# Patient Record
Sex: FEMALE | Race: OTHER | HISPANIC OR LATINO | Employment: UNEMPLOYED | URBAN - METROPOLITAN AREA
[De-identification: names, ages, dates, MRNs, and addresses within clinical notes are randomized per-mention and may not be internally consistent; named-entity substitution may affect disease eponyms.]

---

## 2021-08-07 ENCOUNTER — HOSPITAL ENCOUNTER (EMERGENCY)
Facility: HOSPITAL | Age: 36
Discharge: HOME | End: 2021-08-07
Attending: EMERGENCY MEDICINE
Payer: OTHER GOVERNMENT

## 2021-08-07 VITALS
HEART RATE: 82 BPM | RESPIRATION RATE: 20 BRPM | OXYGEN SATURATION: 98 % | HEIGHT: 58 IN | WEIGHT: 152 LBS | SYSTOLIC BLOOD PRESSURE: 108 MMHG | BODY MASS INDEX: 31.91 KG/M2 | TEMPERATURE: 98 F | DIASTOLIC BLOOD PRESSURE: 58 MMHG

## 2021-08-07 DIAGNOSIS — U07.1 COVID-19: Primary | ICD-10-CM

## 2021-08-07 LAB
B-HCG UR QL: NEGATIVE
CTP QC/QA: YES
CTP QC/QA: YES
SARS-COV-2 RDRP RESP QL NAA+PROBE: POSITIVE

## 2021-08-07 PROCEDURE — 99284 EMERGENCY DEPT VISIT MOD MDM: CPT | Mod: 25

## 2021-08-07 PROCEDURE — U0002 COVID-19 LAB TEST NON-CDC: HCPCS | Performed by: EMERGENCY MEDICINE

## 2021-08-07 PROCEDURE — 81025 URINE PREGNANCY TEST: CPT | Performed by: PHYSICIAN ASSISTANT

## 2021-08-07 RX ORDER — DEXTROMETHORPHAN HYDROBROMIDE, GUAIFENESIN 5; 100 MG/5ML; MG/5ML
650 LIQUID ORAL 3 TIMES DAILY PRN
Qty: 20 TABLET | Refills: 0 | Status: SHIPPED | OUTPATIENT
Start: 2021-08-07

## 2021-08-07 RX ORDER — ALBUTEROL SULFATE 90 UG/1
2 AEROSOL, METERED RESPIRATORY (INHALATION) EVERY 4 HOURS PRN
Qty: 8 G | Refills: 0 | Status: SHIPPED | OUTPATIENT
Start: 2021-08-07 | End: 2022-08-07

## 2021-08-07 RX ORDER — ONDANSETRON 4 MG/1
4 TABLET, ORALLY DISINTEGRATING ORAL EVERY 6 HOURS PRN
Qty: 20 TABLET | Refills: 0 | Status: SHIPPED | OUTPATIENT
Start: 2021-08-07

## 2021-08-07 RX ORDER — GUAIFENESIN/DEXTROMETHORPHAN 100-10MG/5
10 SYRUP ORAL 4 TIMES DAILY PRN
Qty: 120 ML | Refills: 0 | Status: SHIPPED | OUTPATIENT
Start: 2021-08-07 | End: 2021-08-17

## 2021-08-07 RX ORDER — IBUPROFEN 600 MG/1
600 TABLET ORAL EVERY 6 HOURS PRN
Qty: 20 TABLET | Refills: 0 | Status: SHIPPED | OUTPATIENT
Start: 2021-08-07 | End: 2023-05-23

## 2021-08-12 ENCOUNTER — HOSPITAL ENCOUNTER (INPATIENT)
Facility: HOSPITAL | Age: 36
LOS: 1 days | Discharge: LEFT BEFORE DISCHARGE | DRG: 177 | End: 2021-08-13
Attending: EMERGENCY MEDICINE | Admitting: STUDENT IN AN ORGANIZED HEALTH CARE EDUCATION/TRAINING PROGRAM
Payer: OTHER GOVERNMENT

## 2021-08-12 DIAGNOSIS — U07.1 COVID-19: Primary | ICD-10-CM

## 2021-08-12 LAB
ALBUMIN SERPL BCP-MCNC: 3.5 G/DL (ref 3.5–5.2)
ALP SERPL-CCNC: 123 U/L (ref 55–135)
ALT SERPL W/O P-5'-P-CCNC: 44 U/L (ref 10–44)
ANION GAP SERPL CALC-SCNC: 7 MMOL/L (ref 8–16)
AST SERPL-CCNC: 34 U/L (ref 10–40)
B-HCG UR QL: NEGATIVE
BASOPHILS # BLD AUTO: 0.01 K/UL (ref 0–0.2)
BASOPHILS NFR BLD: 0.1 % (ref 0–1.9)
BILIRUB SERPL-MCNC: 0.3 MG/DL (ref 0.1–1)
BUN SERPL-MCNC: 7 MG/DL (ref 6–20)
CALCIUM SERPL-MCNC: 8.2 MG/DL (ref 8.7–10.5)
CHLORIDE SERPL-SCNC: 105 MMOL/L (ref 95–110)
CK SERPL-CCNC: 84 U/L (ref 20–180)
CO2 SERPL-SCNC: 26 MMOL/L (ref 23–29)
CREAT SERPL-MCNC: 0.7 MG/DL (ref 0.5–1.4)
CRP SERPL-MCNC: 25.3 MG/L (ref 0–8.2)
CTP QC/QA: YES
D DIMER PPP IA.FEU-MCNC: 0.52 MG/L FEU
DIFFERENTIAL METHOD: ABNORMAL
EOSINOPHIL # BLD AUTO: 0 K/UL (ref 0–0.5)
EOSINOPHIL NFR BLD: 0.3 % (ref 0–8)
ERYTHROCYTE [DISTWIDTH] IN BLOOD BY AUTOMATED COUNT: 16.8 % (ref 11.5–14.5)
EST. GFR  (AFRICAN AMERICAN): >60 ML/MIN/1.73 M^2
EST. GFR  (NON AFRICAN AMERICAN): >60 ML/MIN/1.73 M^2
FERRITIN SERPL-MCNC: 23 NG/ML (ref 20–300)
GLUCOSE SERPL-MCNC: 124 MG/DL (ref 70–110)
HCT VFR BLD AUTO: 30.2 % (ref 37–48.5)
HGB BLD-MCNC: 9.4 G/DL (ref 12–16)
IMM GRANULOCYTES # BLD AUTO: 0.03 K/UL (ref 0–0.04)
IMM GRANULOCYTES NFR BLD AUTO: 0.3 % (ref 0–0.5)
LACTATE SERPL-SCNC: 1.1 MMOL/L (ref 0.5–2.2)
LDH SERPL L TO P-CCNC: 244 U/L (ref 110–260)
LYMPHOCYTES # BLD AUTO: 2.1 K/UL (ref 1–4.8)
LYMPHOCYTES NFR BLD: 23.9 % (ref 18–48)
MCH RBC QN AUTO: 22.7 PG (ref 27–31)
MCHC RBC AUTO-ENTMCNC: 31.1 G/DL (ref 32–36)
MCV RBC AUTO: 73 FL (ref 82–98)
MONOCYTES # BLD AUTO: 0.4 K/UL (ref 0.3–1)
MONOCYTES NFR BLD: 4.9 % (ref 4–15)
NEUTROPHILS # BLD AUTO: 6.2 K/UL (ref 1.8–7.7)
NEUTROPHILS NFR BLD: 70.5 % (ref 38–73)
NRBC BLD-RTO: 0 /100 WBC
PLATELET # BLD AUTO: 181 K/UL (ref 150–450)
PMV BLD AUTO: 11.6 FL (ref 9.2–12.9)
POTASSIUM SERPL-SCNC: 3.8 MMOL/L (ref 3.5–5.1)
PROCALCITONIN SERPL IA-MCNC: 0.02 NG/ML
PROT SERPL-MCNC: 7.5 G/DL (ref 6–8.4)
RBC # BLD AUTO: 4.14 M/UL (ref 4–5.4)
SODIUM SERPL-SCNC: 138 MMOL/L (ref 136–145)
TROPONIN I SERPL DL<=0.01 NG/ML-MCNC: <0.006 NG/ML (ref 0–0.03)
WBC # BLD AUTO: 8.84 K/UL (ref 3.9–12.7)

## 2021-08-12 PROCEDURE — 93010 ELECTROCARDIOGRAM REPORT: CPT | Mod: ,,, | Performed by: INTERNAL MEDICINE

## 2021-08-12 PROCEDURE — 25500020 PHARM REV CODE 255: Performed by: EMERGENCY MEDICINE

## 2021-08-12 PROCEDURE — 99285 EMERGENCY DEPT VISIT HI MDM: CPT | Mod: 25

## 2021-08-12 PROCEDURE — 27000207 HC ISOLATION

## 2021-08-12 PROCEDURE — 94640 AIRWAY INHALATION TREATMENT: CPT

## 2021-08-12 PROCEDURE — 84484 ASSAY OF TROPONIN QUANT: CPT | Performed by: EMERGENCY MEDICINE

## 2021-08-12 PROCEDURE — 86140 C-REACTIVE PROTEIN: CPT | Performed by: EMERGENCY MEDICINE

## 2021-08-12 PROCEDURE — 94761 N-INVAS EAR/PLS OXIMETRY MLT: CPT

## 2021-08-12 PROCEDURE — 82728 ASSAY OF FERRITIN: CPT | Performed by: EMERGENCY MEDICINE

## 2021-08-12 PROCEDURE — 63600175 PHARM REV CODE 636 W HCPCS: Performed by: EMERGENCY MEDICINE

## 2021-08-12 PROCEDURE — 82550 ASSAY OF CK (CPK): CPT | Performed by: EMERGENCY MEDICINE

## 2021-08-12 PROCEDURE — 96360 HYDRATION IV INFUSION INIT: CPT

## 2021-08-12 PROCEDURE — 80053 COMPREHEN METABOLIC PANEL: CPT | Performed by: EMERGENCY MEDICINE

## 2021-08-12 PROCEDURE — 85025 COMPLETE CBC W/AUTO DIFF WBC: CPT | Performed by: EMERGENCY MEDICINE

## 2021-08-12 PROCEDURE — 81025 URINE PREGNANCY TEST: CPT | Performed by: EMERGENCY MEDICINE

## 2021-08-12 PROCEDURE — 93010 EKG 12-LEAD: ICD-10-PCS | Mod: ,,, | Performed by: INTERNAL MEDICINE

## 2021-08-12 PROCEDURE — 25000242 PHARM REV CODE 250 ALT 637 W/ HCPCS: Performed by: EMERGENCY MEDICINE

## 2021-08-12 PROCEDURE — 93005 ELECTROCARDIOGRAM TRACING: CPT

## 2021-08-12 PROCEDURE — 85379 FIBRIN DEGRADATION QUANT: CPT | Performed by: EMERGENCY MEDICINE

## 2021-08-12 PROCEDURE — 25000003 PHARM REV CODE 250: Performed by: EMERGENCY MEDICINE

## 2021-08-12 PROCEDURE — 83605 ASSAY OF LACTIC ACID: CPT | Performed by: EMERGENCY MEDICINE

## 2021-08-12 PROCEDURE — 12000002 HC ACUTE/MED SURGE SEMI-PRIVATE ROOM

## 2021-08-12 PROCEDURE — 83615 LACTATE (LD) (LDH) ENZYME: CPT | Performed by: EMERGENCY MEDICINE

## 2021-08-12 PROCEDURE — 84145 PROCALCITONIN (PCT): CPT | Performed by: EMERGENCY MEDICINE

## 2021-08-12 RX ORDER — ALBUTEROL SULFATE 90 UG/1
2 AEROSOL, METERED RESPIRATORY (INHALATION) ONCE
Status: COMPLETED | OUTPATIENT
Start: 2021-08-12 | End: 2021-08-12

## 2021-08-12 RX ORDER — DEXAMETHASONE SODIUM PHOSPHATE 4 MG/ML
6 INJECTION, SOLUTION INTRA-ARTICULAR; INTRALESIONAL; INTRAMUSCULAR; INTRAVENOUS; SOFT TISSUE
Status: COMPLETED | OUTPATIENT
Start: 2021-08-12 | End: 2021-08-12

## 2021-08-12 RX ADMIN — DEXAMETHASONE SODIUM PHOSPHATE 6 MG: 4 INJECTION INTRA-ARTICULAR; INTRALESIONAL; INTRAMUSCULAR; INTRAVENOUS; SOFT TISSUE at 10:08

## 2021-08-12 RX ADMIN — IOHEXOL 70 ML: 350 INJECTION, SOLUTION INTRAVENOUS at 09:08

## 2021-08-12 RX ADMIN — SODIUM CHLORIDE 500 ML: 0.9 INJECTION, SOLUTION INTRAVENOUS at 07:08

## 2021-08-12 RX ADMIN — ALBUTEROL SULFATE 2 PUFF: 90 AEROSOL, METERED RESPIRATORY (INHALATION) at 08:08

## 2021-08-13 VITALS
RESPIRATION RATE: 33 BRPM | OXYGEN SATURATION: 100 % | SYSTOLIC BLOOD PRESSURE: 115 MMHG | BODY MASS INDEX: 31.77 KG/M2 | HEART RATE: 103 BPM | DIASTOLIC BLOOD PRESSURE: 63 MMHG | HEIGHT: 58 IN | TEMPERATURE: 99 F

## 2022-03-12 ENCOUNTER — HOSPITAL ENCOUNTER (EMERGENCY)
Facility: HOSPITAL | Age: 37
Discharge: HOME | End: 2022-03-12
Attending: EMERGENCY MEDICINE

## 2022-03-12 VITALS
SYSTOLIC BLOOD PRESSURE: 116 MMHG | HEART RATE: 91 BPM | TEMPERATURE: 99 F | DIASTOLIC BLOOD PRESSURE: 67 MMHG | WEIGHT: 150 LBS | OXYGEN SATURATION: 98 % | RESPIRATION RATE: 20 BRPM | BODY MASS INDEX: 30.24 KG/M2 | HEIGHT: 59 IN

## 2022-03-12 DIAGNOSIS — J98.01 BRONCHOSPASM: ICD-10-CM

## 2022-03-12 LAB
B-HCG UR QL: NEGATIVE
CTP QC/QA: YES
CTP QC/QA: YES
SARS-COV-2 RDRP RESP QL NAA+PROBE: NEGATIVE

## 2022-03-12 PROCEDURE — 25000003 PHARM REV CODE 250: Performed by: EMERGENCY MEDICINE

## 2022-03-12 PROCEDURE — 96372 THER/PROPH/DIAG INJ SC/IM: CPT | Performed by: EMERGENCY MEDICINE

## 2022-03-12 PROCEDURE — 63600175 PHARM REV CODE 636 W HCPCS: Performed by: EMERGENCY MEDICINE

## 2022-03-12 PROCEDURE — 99284 EMERGENCY DEPT VISIT MOD MDM: CPT | Mod: 25

## 2022-03-12 PROCEDURE — U0002 COVID-19 LAB TEST NON-CDC: HCPCS | Performed by: EMERGENCY MEDICINE

## 2022-03-12 PROCEDURE — 81025 URINE PREGNANCY TEST: CPT | Performed by: EMERGENCY MEDICINE

## 2022-03-12 PROCEDURE — 94640 AIRWAY INHALATION TREATMENT: CPT

## 2022-03-12 PROCEDURE — 25000242 PHARM REV CODE 250 ALT 637 W/ HCPCS: Performed by: EMERGENCY MEDICINE

## 2022-03-12 RX ORDER — ALBUTEROL SULFATE 90 UG/1
1-2 AEROSOL, METERED RESPIRATORY (INHALATION) EVERY 6 HOURS PRN
Qty: 18 G | Refills: 2 | Status: SHIPPED | OUTPATIENT
Start: 2022-03-12 | End: 2022-06-10

## 2022-03-12 RX ORDER — PREDNISONE 20 MG/1
40 TABLET ORAL DAILY
Qty: 10 TABLET | Refills: 0 | Status: SHIPPED | OUTPATIENT
Start: 2022-03-12 | End: 2022-03-17

## 2022-03-12 RX ORDER — ACETAMINOPHEN 500 MG
1000 TABLET ORAL
Status: COMPLETED | OUTPATIENT
Start: 2022-03-12 | End: 2022-03-12

## 2022-03-12 RX ORDER — DEXAMETHASONE SODIUM PHOSPHATE 4 MG/ML
6 INJECTION, SOLUTION INTRA-ARTICULAR; INTRALESIONAL; INTRAMUSCULAR; INTRAVENOUS; SOFT TISSUE
Status: COMPLETED | OUTPATIENT
Start: 2022-03-12 | End: 2022-03-12

## 2022-03-12 RX ORDER — IPRATROPIUM BROMIDE AND ALBUTEROL SULFATE 2.5; .5 MG/3ML; MG/3ML
3 SOLUTION RESPIRATORY (INHALATION) ONCE
Status: COMPLETED | OUTPATIENT
Start: 2022-03-12 | End: 2022-03-12

## 2022-03-12 RX ADMIN — IPRATROPIUM BROMIDE AND ALBUTEROL SULFATE 3 ML: 2.5; .5 SOLUTION RESPIRATORY (INHALATION) at 08:03

## 2022-03-12 RX ADMIN — ACETAMINOPHEN 1000 MG: 500 TABLET ORAL at 09:03

## 2022-03-12 RX ADMIN — DEXAMETHASONE SODIUM PHOSPHATE 6 MG: 4 INJECTION INTRA-ARTICULAR; INTRALESIONAL; INTRAMUSCULAR; INTRAVENOUS; SOFT TISSUE at 07:03

## 2022-03-12 RX ADMIN — IPRATROPIUM BROMIDE AND ALBUTEROL SULFATE 3 ML: .5; 3 SOLUTION RESPIRATORY (INHALATION) at 09:03

## 2022-03-13 NOTE — ED NOTES
Pt states that she feel a little better. The pt has been coughing a little more . She states that she is coughing up green /yellow phlegm.

## 2022-03-13 NOTE — ED PROVIDER NOTES
"SCRIBE #1 NOTE: I, Jackelin Lyons, am scribing for, and in the presence of,  Jayashree Sesay MD. I have scribed the following portions of the note - Other sections scribed: HPI, BRYAN PETERSEN.           EM PHYSICIAN NOTE    HPI  This patient presents with a complaint of   Chief Complaint   Patient presents with    Cough     Pt reporting cough since September. Pt reporting worsening cough since Wednesday. Pt reports she has been wheezing and not sleeping due to the cough. Pt reports hx of disk to neck and uses a walker to walk.        HPI:   Karen Burroughs is a 36 y.o. female, with no pertinent past medical history, who presents to the ED with intermittent cough onset 7 months ago. Patient notes associated symptoms of occasional fever, nausea, vomiting, and sore throat. She states that her cough has been worsening over the past 3 days which prompted her arrival to the ED today. Patient reports that her nausea and vomiting is exacerbated by coughing. Patient has attempted treatment with Tylenol and Ibuprofen, but notes no alleviation. No other exacerbating or alleviating factors. Patient notes that she has seen her PCP for these symptoms, but that "nothing is helping." Patient states her LMP was 2 weeks ago. Patient denies other associated symptoms.       REVIEW of PMH, SOC History and Family History:  History reviewed. No pertinent past medical history.  Social history noncontributory  Family history noncontributory  Review of patient's allergies indicates:  No Known Allergies        REVIEW of SYSTEMS  Source: Patient  The nurse's notes and triage vital signs were reviewed.  GENERAL/CONSTITUTIONAL: There is no report of fatigue, weakness, or unexplained weight loss. SEE HPI.  CARDIOVASCULAR: There is no report of chest pain   RESPIRATORY: There is no report of SOB. SEE HPI.  GASTROINTESTINAL: There is no report of diarrhea. SEE HPI.  MUSCULOSKELETAL: There is no report of joint or muscle pain. No muscle weakness or " "tenderness.  SKIN AND BREASTS: There is no report of easy bruising, skin redness, skin rash.  HEMATOLOGIC/LYMPHATIC: There is no report of anemia, bleeding or clotting defects. There is no report of anticoagulant use.  The remainder of the ROS is negative.        PHYSICAL EXAMINATION    ED Triage Vitals [03/12/22 1904]   Enc Vitals Group      /61      Pulse 103      Resp 18      Temp 99 °F (37.2 °C)      Temp src Oral      SpO2 97 %      Weight 150 lb      Height 4' 11"      Head Circumference       Peak Flow       Pain Score       Pain Loc       Pain Edu?       Excl. in GC?      Vital signs and Pulse Ox reviewed in clinical context. Abnormalities noted: none:  Heart rate, blood pressure, temperature, respiratory rate and pulse ox are all within normal limits for age  Pt's level of consciousness is alert, and the patient is in mild distress.  Skin: warm, pink and dry.  Capillary refill is less than 2 seconds.  Mucosa:moist  Head and Neck: WNL. Mild posterior oropharyngeal erythema.   Cardiac exam: RRR. +2 pulses.  Pulmonary exam: unlabored. Anterior expiratory wheezing. Prolonged expiratory phase.  Abd Exam: soft nontender   Musculoskeletal: no joint tenderness or deformity. No edema.  Neurologic: GCS: GCS 15; 5 over 5 strength, cranial nerves intact, neck supple       Initial Impression:  Bronchospasm  Plan:  DuoNeb, Decadron, chest x-ray and COVID test  Jayashree Sesay MD, 7:24 PM 3/12/2022      Medical decision making:   Nurses notes and Vital Signs reviewed.  Orders Placed This Encounter   Procedures    X-Ray Chest PA And Lateral    Ambulatory referral/consult to Pulmonology    Inhalation Treatment Once    POCT urine pregnancy    POCT COVID-19 Rapid Screening       ED Course as of 03/12/22 2116   Sat Mar 12, 2022   2021 UPT negative [MH]   2021 COVID negative [MH]   2021 Chest x-ray normal [MH]   2107 Feeling better after DuoNebs. []      ED Course User Index  [MH] Jayashree Sesay MD "       Diagnoses that have been ruled out:   None   Diagnoses that are still under consideration:   None   Final diagnoses:   Bronchospasm            Follow-up Information     Follow up With Specialties Details Why Contact Info    Robert Da Silva MD Internal Medicine Schedule an appointment as soon as possible for a visit in 1 week Call to schedule an appointment [address] [place] [address] Dr [address] [place] [id number]  [phone]      [facility]  In 2 days As needed, For Follow-up and Recheck Visit [email or web address] to schedule an appointment or have a same day ONLINE checkup.        ED Prescriptions     Medication Sig Dispense Start Date End Date Auth. Provider    albuterol (PROVENTIL/VENTOLIN HFA) 90 mcg/actuation inhaler Inhale 1-2 puffs into the lungs every 6 (six) hours as needed for Wheezing. Rescue 18 g 3/12/2022 6/10/2022 Jayashree Sesay MD    predniSONE (DELTASONE) 20 MG tablet Take 2 tablets (40 mg total) by mouth once daily. for 5 days 10 tablet 3/12/2022 3/17/2022 Jayashree Sesay MD          This note was created using Dictation Software.  This program may occasionally misinterpret certain words and phrases.      SCRIBE ATTESTATION NOTE:  I attest that I personally performed the services documented by the scribe and acknowledged and confirm the content of the note.   Nurses notes were reviewed.  Jayashree Sesay        Nurses notes were reviewed.         ED Disposition Condition    Discharge Stable                          Jayashree Sesay MD  03/12/22 3942

## 2022-03-13 NOTE — ED TRIAGE NOTES
Pt states that she have been coughing off and on since September. She states that she has seen her PCP but nothing has resolved the cough. She states she started  Wheezing but denies any history of asthma. The pt describes her discomfort as tightness and pressure.

## 2023-05-23 ENCOUNTER — HOSPITAL ENCOUNTER (EMERGENCY)
Facility: HOSPITAL | Age: 38
Discharge: HOME | End: 2023-05-23
Attending: STUDENT IN AN ORGANIZED HEALTH CARE EDUCATION/TRAINING PROGRAM

## 2023-05-23 VITALS
DIASTOLIC BLOOD PRESSURE: 61 MMHG | TEMPERATURE: 98 F | OXYGEN SATURATION: 96 % | SYSTOLIC BLOOD PRESSURE: 115 MMHG | WEIGHT: 160 LBS | HEIGHT: 61 IN | RESPIRATION RATE: 20 BRPM | HEART RATE: 87 BPM | BODY MASS INDEX: 30.21 KG/M2

## 2023-05-23 DIAGNOSIS — R93.89 ABNORMAL CT SCAN: ICD-10-CM

## 2023-05-23 DIAGNOSIS — M54.41 CHRONIC LOW BACK PAIN WITH BILATERAL SCIATICA, UNSPECIFIED BACK PAIN LATERALITY: ICD-10-CM

## 2023-05-23 DIAGNOSIS — M54.42 CHRONIC LOW BACK PAIN WITH BILATERAL SCIATICA, UNSPECIFIED BACK PAIN LATERALITY: ICD-10-CM

## 2023-05-23 DIAGNOSIS — G89.29 CHRONIC LOW BACK PAIN WITH BILATERAL SCIATICA, UNSPECIFIED BACK PAIN LATERALITY: ICD-10-CM

## 2023-05-23 DIAGNOSIS — G44.209 TENSION-TYPE HEADACHE, NOT INTRACTABLE, UNSPECIFIED CHRONICITY PATTERN: Primary | ICD-10-CM

## 2023-05-23 DIAGNOSIS — R03.0 ELEVATED BLOOD PRESSURE READING: ICD-10-CM

## 2023-05-23 LAB
ALBUMIN SERPL BCP-MCNC: 4 G/DL (ref 3.5–5.2)
ALP SERPL-CCNC: 121 U/L (ref 55–135)
ALT SERPL W/O P-5'-P-CCNC: 37 U/L (ref 10–44)
ANION GAP SERPL CALC-SCNC: 8 MMOL/L (ref 8–16)
AST SERPL-CCNC: 26 U/L (ref 10–40)
B-HCG UR QL: NEGATIVE
BACTERIA GENITAL QL WET PREP: NORMAL
BASOPHILS # BLD AUTO: 0.03 K/UL (ref 0–0.2)
BASOPHILS NFR BLD: 0.3 % (ref 0–1.9)
BILIRUB SERPL-MCNC: 0.3 MG/DL (ref 0.1–1)
BILIRUB UR QL STRIP: NEGATIVE
BNP SERPL-MCNC: 14 PG/ML (ref 0–99)
BUN SERPL-MCNC: 10 MG/DL (ref 6–20)
CALCIUM SERPL-MCNC: 9 MG/DL (ref 8.7–10.5)
CHLORIDE SERPL-SCNC: 107 MMOL/L (ref 95–110)
CLARITY UR: CLEAR
CLUE CELLS VAG QL WET PREP: NORMAL
CO2 SERPL-SCNC: 23 MMOL/L (ref 23–29)
COLOR UR: YELLOW
CREAT SERPL-MCNC: 0.6 MG/DL (ref 0.5–1.4)
CTP QC/QA: YES
DIFFERENTIAL METHOD: ABNORMAL
EOSINOPHIL # BLD AUTO: 0.2 K/UL (ref 0–0.5)
EOSINOPHIL NFR BLD: 1.7 % (ref 0–8)
ERYTHROCYTE [DISTWIDTH] IN BLOOD BY AUTOMATED COUNT: 15.5 % (ref 11.5–14.5)
EST. GFR  (NO RACE VARIABLE): >60 ML/MIN/1.73 M^2
ETHANOL SERPL-MCNC: <10 MG/DL
FILAMENT FUNGI VAG WET PREP-#/AREA: NORMAL
GLUCOSE SERPL-MCNC: 118 MG/DL (ref 70–110)
GLUCOSE UR QL STRIP: NEGATIVE
HCT VFR BLD AUTO: 32.3 % (ref 37–48.5)
HGB BLD-MCNC: 10.3 G/DL (ref 12–16)
HGB UR QL STRIP: NEGATIVE
IMM GRANULOCYTES # BLD AUTO: 0.08 K/UL (ref 0–0.04)
IMM GRANULOCYTES NFR BLD AUTO: 0.7 % (ref 0–0.5)
KETONES UR QL STRIP: NEGATIVE
LEUKOCYTE ESTERASE UR QL STRIP: NEGATIVE
LIPASE SERPL-CCNC: 20 U/L (ref 4–60)
LYMPHOCYTES # BLD AUTO: 3.3 K/UL (ref 1–4.8)
LYMPHOCYTES NFR BLD: 30.1 % (ref 18–48)
MAGNESIUM SERPL-MCNC: 2 MG/DL (ref 1.6–2.6)
MCH RBC QN AUTO: 23.5 PG (ref 27–31)
MCHC RBC AUTO-ENTMCNC: 31.9 G/DL (ref 32–36)
MCV RBC AUTO: 74 FL (ref 82–98)
MONOCYTES # BLD AUTO: 0.8 K/UL (ref 0.3–1)
MONOCYTES NFR BLD: 7.3 % (ref 4–15)
NEUTROPHILS # BLD AUTO: 6.6 K/UL (ref 1.8–7.7)
NEUTROPHILS NFR BLD: 59.9 % (ref 38–73)
NITRITE UR QL STRIP: NEGATIVE
NRBC BLD-RTO: 0 /100 WBC
PH UR STRIP: 5 [PH] (ref 5–8)
PLATELET # BLD AUTO: 277 K/UL (ref 150–450)
PMV BLD AUTO: 11.9 FL (ref 9.2–12.9)
POTASSIUM SERPL-SCNC: 3.9 MMOL/L (ref 3.5–5.1)
PROT SERPL-MCNC: 7.7 G/DL (ref 6–8.4)
PROT UR QL STRIP: NEGATIVE
RBC # BLD AUTO: 4.38 M/UL (ref 4–5.4)
SODIUM SERPL-SCNC: 138 MMOL/L (ref 136–145)
SP GR UR STRIP: <1.005 (ref 1–1.03)
SPECIMEN SOURCE: NORMAL
T VAGINALIS GENITAL QL WET PREP: NORMAL
TROPONIN I SERPL DL<=0.01 NG/ML-MCNC: <0.006 NG/ML (ref 0–0.03)
TROPONIN I SERPL DL<=0.01 NG/ML-MCNC: <0.006 NG/ML (ref 0–0.03)
URN SPEC COLLECT METH UR: ABNORMAL
UROBILINOGEN UR STRIP-ACNC: NEGATIVE EU/DL
WBC # BLD AUTO: 10.95 K/UL (ref 3.9–12.7)
WBC #/AREA VAG WET PREP: NORMAL
YEAST GENITAL QL WET PREP: NORMAL

## 2023-05-23 PROCEDURE — 99285 EMERGENCY DEPT VISIT HI MDM: CPT | Mod: 25

## 2023-05-23 PROCEDURE — 87210 SMEAR WET MOUNT SALINE/INK: CPT | Performed by: EMERGENCY MEDICINE

## 2023-05-23 PROCEDURE — 83735 ASSAY OF MAGNESIUM: CPT | Performed by: STUDENT IN AN ORGANIZED HEALTH CARE EDUCATION/TRAINING PROGRAM

## 2023-05-23 PROCEDURE — 83880 ASSAY OF NATRIURETIC PEPTIDE: CPT | Performed by: STUDENT IN AN ORGANIZED HEALTH CARE EDUCATION/TRAINING PROGRAM

## 2023-05-23 PROCEDURE — 25500020 PHARM REV CODE 255: Performed by: STUDENT IN AN ORGANIZED HEALTH CARE EDUCATION/TRAINING PROGRAM

## 2023-05-23 PROCEDURE — 80053 COMPREHEN METABOLIC PANEL: CPT | Performed by: STUDENT IN AN ORGANIZED HEALTH CARE EDUCATION/TRAINING PROGRAM

## 2023-05-23 PROCEDURE — 87591 N.GONORRHOEAE DNA AMP PROB: CPT | Performed by: EMERGENCY MEDICINE

## 2023-05-23 PROCEDURE — 96361 HYDRATE IV INFUSION ADD-ON: CPT

## 2023-05-23 PROCEDURE — 93010 EKG 12-LEAD: ICD-10-PCS | Mod: ,,, | Performed by: INTERNAL MEDICINE

## 2023-05-23 PROCEDURE — 63600175 PHARM REV CODE 636 W HCPCS: Performed by: EMERGENCY MEDICINE

## 2023-05-23 PROCEDURE — 96375 TX/PRO/DX INJ NEW DRUG ADDON: CPT

## 2023-05-23 PROCEDURE — 93005 ELECTROCARDIOGRAM TRACING: CPT

## 2023-05-23 PROCEDURE — 63600175 PHARM REV CODE 636 W HCPCS: Performed by: STUDENT IN AN ORGANIZED HEALTH CARE EDUCATION/TRAINING PROGRAM

## 2023-05-23 PROCEDURE — 82077 ASSAY SPEC XCP UR&BREATH IA: CPT | Performed by: STUDENT IN AN ORGANIZED HEALTH CARE EDUCATION/TRAINING PROGRAM

## 2023-05-23 PROCEDURE — 81003 URINALYSIS AUTO W/O SCOPE: CPT | Performed by: STUDENT IN AN ORGANIZED HEALTH CARE EDUCATION/TRAINING PROGRAM

## 2023-05-23 PROCEDURE — 83690 ASSAY OF LIPASE: CPT | Performed by: STUDENT IN AN ORGANIZED HEALTH CARE EDUCATION/TRAINING PROGRAM

## 2023-05-23 PROCEDURE — 96374 THER/PROPH/DIAG INJ IV PUSH: CPT

## 2023-05-23 PROCEDURE — 93010 ELECTROCARDIOGRAM REPORT: CPT | Mod: ,,, | Performed by: INTERNAL MEDICINE

## 2023-05-23 PROCEDURE — 85025 COMPLETE CBC W/AUTO DIFF WBC: CPT | Performed by: STUDENT IN AN ORGANIZED HEALTH CARE EDUCATION/TRAINING PROGRAM

## 2023-05-23 PROCEDURE — 81025 URINE PREGNANCY TEST: CPT | Performed by: STUDENT IN AN ORGANIZED HEALTH CARE EDUCATION/TRAINING PROGRAM

## 2023-05-23 PROCEDURE — 84484 ASSAY OF TROPONIN QUANT: CPT | Performed by: STUDENT IN AN ORGANIZED HEALTH CARE EDUCATION/TRAINING PROGRAM

## 2023-05-23 PROCEDURE — 25000003 PHARM REV CODE 250: Performed by: STUDENT IN AN ORGANIZED HEALTH CARE EDUCATION/TRAINING PROGRAM

## 2023-05-23 RX ORDER — NAPROXEN 500 MG/1
500 TABLET ORAL 2 TIMES DAILY WITH MEALS
Qty: 60 TABLET | Refills: 0 | Status: SHIPPED | OUTPATIENT
Start: 2023-05-23 | End: 2023-06-12

## 2023-05-23 RX ORDER — HYDROMORPHONE HYDROCHLORIDE 1 MG/ML
0.5 INJECTION, SOLUTION INTRAMUSCULAR; INTRAVENOUS; SUBCUTANEOUS
Status: COMPLETED | OUTPATIENT
Start: 2023-05-23 | End: 2023-05-23

## 2023-05-23 RX ORDER — METHOCARBAMOL 500 MG/1
1000 TABLET, FILM COATED ORAL 3 TIMES DAILY
Qty: 30 TABLET | Refills: 0 | Status: SHIPPED | OUTPATIENT
Start: 2023-05-23 | End: 2023-05-23 | Stop reason: SDUPTHER

## 2023-05-23 RX ORDER — DIPHENHYDRAMINE HYDROCHLORIDE 50 MG/ML
25 INJECTION INTRAMUSCULAR; INTRAVENOUS
Status: COMPLETED | OUTPATIENT
Start: 2023-05-23 | End: 2023-05-23

## 2023-05-23 RX ORDER — METHOCARBAMOL 500 MG/1
1000 TABLET, FILM COATED ORAL 3 TIMES DAILY
Qty: 30 TABLET | Refills: 0 | Status: SHIPPED | OUTPATIENT
Start: 2023-05-23 | End: 2023-05-28

## 2023-05-23 RX ORDER — PROCHLORPERAZINE MALEATE 5 MG
10 TABLET ORAL
Status: COMPLETED | OUTPATIENT
Start: 2023-05-23 | End: 2023-05-23

## 2023-05-23 RX ORDER — LORAZEPAM 2 MG/ML
1 INJECTION INTRAMUSCULAR
Status: COMPLETED | OUTPATIENT
Start: 2023-05-23 | End: 2023-05-23

## 2023-05-23 RX ORDER — LIDOCAINE 50 MG/G
1 PATCH TOPICAL DAILY PRN
Qty: 10 PATCH | Refills: 0 | Status: SHIPPED | OUTPATIENT
Start: 2023-05-23 | End: 2023-06-02

## 2023-05-23 RX ADMIN — HYDROMORPHONE HYDROCHLORIDE 0.5 MG: 1 INJECTION, SOLUTION INTRAMUSCULAR; INTRAVENOUS; SUBCUTANEOUS at 12:05

## 2023-05-23 RX ADMIN — SODIUM CHLORIDE 1000 ML: 9 INJECTION, SOLUTION INTRAVENOUS at 05:05

## 2023-05-23 RX ADMIN — IOHEXOL 80 ML: 350 INJECTION, SOLUTION INTRAVENOUS at 07:05

## 2023-05-23 RX ADMIN — DIPHENHYDRAMINE HYDROCHLORIDE 25 MG: 50 INJECTION, SOLUTION INTRAMUSCULAR; INTRAVENOUS at 05:05

## 2023-05-23 RX ADMIN — PROCHLORPERAZINE MALEATE 10 MG: 5 TABLET ORAL at 06:05

## 2023-05-23 RX ADMIN — LORAZEPAM 1 MG: 2 INJECTION INTRAMUSCULAR; INTRAVENOUS at 05:05

## 2023-05-23 NOTE — ED PROVIDER NOTES
Encounter Date: 5/23/2023       History     Chief Complaint   Patient presents with    Generalized Body Aches     Patient arrives via EMS with complaints of stress and generalized body pain, ongoing all day. EMS reports that she has been arguing with a neighbor, causing her to feel stressed out. Hypertensive, previous CVA 5 years ago.     HPI    Patient is a 38-year-old female past medical history of chronic lower back pain, ambulates with a walker, presents to the emergency department for evaluation of bilateral temporal headache today.  Notes she is not had a headache like this in 5 years.  Patient notes that she was arguing with her neighbor that cause her to feel very stressed out.  She notes substernal chest pain that is nonradiating starting an hour or 2 prior to arrival.  She denies nausea, vomiting.  She notes chronic upper abdominal pain but denies worsening recently.  States that she has paresthesias to her bilateral upper and bilateral lower extremities.  She denies fever, neck pain, chills dysuria, hematuria, visual deficits.  No other mitigating or exacerbating factors.  Patient notes she feels generally weak but no focal weakness.  Review of patient's allergies indicates:  No Known Allergies  History reviewed. No pertinent past medical history.  Past Surgical History:   Procedure Laterality Date    CHOLECYSTECTOMY      OOPHORECTOMY  2007 [place]     History reviewed. No pertinent family history.  Social History     Tobacco Use    Smoking status: Never    Smokeless tobacco: Never   Substance Use Topics    Alcohol use: No    Drug use: Never     Review of Systems   All other systems reviewed and are negative.    Physical Exam     Initial Vitals [05/23/23 0427]   BP Pulse Resp Temp SpO2   (!) 180/110 86 18 98.2 °F (36.8 °C) 100 %      MAP       --         Physical Exam    Nursing note and vitals reviewed.  Constitutional: She appears well-developed and well-nourished. She is not diaphoretic. No distress.    HENT:   Head: Normocephalic and atraumatic.   Right Ear: External ear normal.   Left Ear: External ear normal.   Nose: Nose normal.   Eyes: Conjunctivae and EOM are normal. Pupils are equal, round, and reactive to light. No scleral icterus.   Neck: Neck supple. No tracheal deviation present.   Normal range of motion.  Cardiovascular:  Normal rate, regular rhythm, normal heart sounds and intact distal pulses.     Exam reveals no gallop and no friction rub.       No murmur heard.  Pulmonary/Chest: Breath sounds normal. No respiratory distress.   Abdominal: Abdomen is soft. Bowel sounds are normal. There is no abdominal tenderness.   Musculoskeletal:      Cervical back: Normal range of motion and neck supple.     Neurological: She is alert and oriented to person, place, and time. She has normal strength. No cranial nerve deficit or sensory deficit. GCS score is 15. GCS eye subscore is 4. GCS verbal subscore is 5. GCS motor subscore is 6.   Generalized weakness noted in bilateral upper lower extremities.  Equal normal  strength noted in upper extremities.  She notes paresthesias to the upper extremities.  Sensation is similar bilaterally.  She notes weakness with hip flexion in bilateral lower extremities.  Patient with normal sensation to the bilateral lower extremities.   Skin: Skin is warm and dry.   No jaundice   Psychiatric: She has a normal mood and affect. Thought content normal.   Patient tearful during exam.       ED Course   Procedures  Labs Reviewed   CBC W/ AUTO DIFFERENTIAL - Abnormal; Notable for the following components:       Result Value    Hemoglobin 10.3 (*)     Hematocrit 32.3 (*)     MCV 74 (*)     MCH 23.5 (*)     MCHC 31.9 (*)     RDW 15.5 (*)     Immature Granulocytes 0.7 (*)     Immature Grans (Abs) 0.08 (*)     All other components within normal limits   COMPREHENSIVE METABOLIC PANEL - Abnormal; Notable for the following components:    Glucose 118 (*)     All other components within  normal limits   URINALYSIS, REFLEX TO URINE CULTURE - Abnormal; Notable for the following components:    Specific Gravity, UA <1.005 (*)     All other components within normal limits    Narrative:     Specimen Source->Urine   C. TRACHOMATIS/N. GONORRHOEAE BY AMP DNA    Narrative:     Sources by Resulting Lab:->[facility]  Release to patient->Immediate   VAGINAL SCREEN    Narrative:     Release to patient->Immediate   TROPONIN I   B-TYPE NATRIURETIC PEPTIDE   MAGNESIUM   LIPASE   ALCOHOL,MEDICAL (ETHANOL)   TROPONIN I   POCT URINE PREGNANCY        ECG Results              EKG 12-lead (Final result)  Result time 05/23/23 08:46:31      Final result by Interface, Lab In [facility] (05/23/23 08:46:31)                   Narrative:    Test Reason : R03.0,    Vent. Rate : 082 BPM     Atrial Rate : 082 BPM     P-R Int : 156 ms          QRS Dur : 088 ms      QT Int : 382 ms       P-R-T Axes : 019 019 010 degrees     QTc Int : 446 ms    Normal sinus rhythm  Normal ECG  When compared with ECG of 12-AUG-2021 18:39,  No significant change was found  Confirmed by Nico Pruett MD ([id number]) on 5/23/2023 8:46:18 AM    Referred By: [email or web address]   SELF           Confirmed By:Nico Pruett MD                                  Imaging Results              CT Abdomen Pelvis With Contrast (Final result)  Result time 05/23/23 08:50:42      Final result by Jimmy Richey MD (05/23/23 08:50:42)                   Impression:      Hepatomegaly with diffuse hepatic steatosis.  A relatively high attenuation/enhancing 1.7 cm right hepatic lobe nodule may represent focal fatty sparing; neoplasm is not fully excluded.    Very subtle small focus of diminished cortical enhancement in the medial aspect of the left upper renal pole.  DDX includes mild/early focal segmental pyelonephritis; underlying neoplasm or other lesion not fully excluded.    Trace intra-vaginal fluid.  Although this could be physiologic, pathology, such as a uncomplicated PID, is not  excluded.  Correlate clinically.    Although no free intraperitoneal fluid is demonstrated, the crenulated appearance of a small right ovarian cyst suggests that it might have recently ruptured.  This can potentially be a source of pain//discomfort.    By size criteria, no pathologic lymph node enlargement is demonstrated in the abdomen or pelvis.  Normal appendix.    RECOMMENDATIONS:  Clinical correlation; follow-up hepatic and left renal imaging, such as sonography and/or multiphasic liver mass/renal mass protocol MRI.      Electronically signed by: Jimmy Richey  Date:    05/23/2023  Time:    08:50               Narrative:    EXAMINATION:  CT ABDOMEN PELVIS WITH CONTRAST    CLINICAL HISTORY:  Nausea/vomiting;Epigastric pain;abdominal pain, LN noted to the RLQ on CT lumbar;    Today's point of care urine pregnancy test is reported negative under the labs tab in the electronic health record.    TECHNIQUE:  Low dose axial images, sagittal and coronal reformations were obtained from the lung bases to the pubic symphysis following the IV administration of 80 mL of Omnipaque 350 and the oral administration of 30 mL of Omnipaque 350.    COMPARISON:  No prior abdominopelvic CT.  Limited correlation is made with today's noncontrast lumbar spine CT, chest CTA of 08/12/2021, and abdominal and pelvic sonograms of 05/20/2014.    FINDINGS:  Lower thorax: Previously demonstrated patchy bilateral airspace disease has largely resolved; some minimal patchy very faint ground-glass opacities remain in portions of both lung bases.  Trace intraluminal gas in the pulmonary trunk (estimated volume less than 1 mL).    Abdomen CT:    Liver: Enlarged, with craniocaudal dimension of the right hepatic lobe of approximately 23 cm.  Diffusely hypoattenuating appearance, suspicious for diffuse hepatic steatosis.  An approximate 1.7 cm rounded nodular area of relatively higher attenuation/enhancement in the posteromedial subcapsular aspect of  the inferior portion of the right hepatic lobe posterior segment may represent focal fatty sparing, within or significant hepatic nodule is not excluded.    Visualized major hepatic blood vessels appear grossly patent.    Gallbladder: Surgically absent, with surgical clips in the gallbladder fossa.    Bile ducts: No abnormal biliary ductal dilatation is demonstrated.    Pancreas: Normal CT appearance.  No ductal dilatation apparent.    Spleen: Normal.    Adrenals: Normal.    Kidneys: Prompt and symmetric cortical nephrograms bilaterally except for a small faint region of diminished cortical enhancement in superomedial aspect of the left kidney.  No hydronephrosis.  No radiopaque renal,    ureteral calculi, urinary bladder, or urethral calculi.    Stomach and intestines: Of the gastric lumen is suboptimally distended, limiting CT assessment for abnormal mucosal or mural thickening in the stomach.  Normal course and caliber of the duodenal C-loop.  No CT evidence of high-grade large or small bowel obstruction or other acute intestinal abnormalities.    No pneumoperitoneum or abnormal peritoneal fluid collections.    Vasculature: Normal course and caliber of the abdominal aorta and IVC.  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    Lymph nodes: By size criteria, no pathologically enlarged lymph nodes are demonstrated in the abdomen or pelvis.  There are some mildly prominent retroperitoneal and mesenteric lymph nodes, but these are of uncertain clinical significance.    Pelvis CT: Left ovary not identified with certainty.    Mild prominence of the right ovary, measuring approximately 4.4 x 3 x 1.9 cm.    The crenulated appearance of a rim enhancing 1.6 cm right ovarian cyst suggests that it may have recently ruptured; this could potentially contribute to abdominal/pelvic pain.    The low-attenuation appearance  of the approximately 11 mm thick fundal endometrium may be physiologic.    There is some low attenuation intraluminal fluid in the upper portion of the vagina.    The urinary bladder is moderately distended.    Musculoskeletal: Bilateral L5 spondylolysis (chronic-appearing) with minimal grade 1 L5-S1 spondylolisthesis.  No advanced degenerative changes are demonstrated.  Nodular subcentimeter intraosseous sclerotic foci in portions of the pelvic bones likely represent bone islands; neoplasm less likely.    Two-dimensional multiplanar reformations redemonstrate and help further characterize the findings seen on axial images.                                       X-Ray Chest AP Portable (Final result)  Result time 05/23/23 06:15:20      Final result by Concha Alas MD (05/23/23 06:15:20)                   Impression:      No acute intrathoracic abnormality identified on this single radiographic view of the chest.      Electronically signed by: Concha Alas MD  Date:    05/23/2023  Time:    06:15               Narrative:    EXAMINATION:  XR CHEST AP PORTABLE    CLINICAL HISTORY:  Chest Pain;    TECHNIQUE:  Single frontal view of the chest was performed.    COMPARISON:  03/12/2022    FINDINGS:  Cardiac monitoring leads overlie the chest.  Cardiomediastinal silhouette is within normal limits of size and configuration.  There are low lung volumes with subsequent accentuation of pulmonary bronchovascular markings.  No confluent airspace consolidation, substantial volume of pleural fluid or pneumothorax identified.  There is postoperative change of the visualized cervical spine.  Osseous structures intact.                                       CT Head Without Contrast (Final result)  Result time 05/23/23 06:30:01      Final result by Concha Alas MD (05/23/23 06:30:01)                   Impression:      No CT evidence of acute intracranial abnormality. If the patient's headaches are sufficiently clinically suspicious,  or associated with signs of elevated ICP, focal neurologic deficits, nausea, or vomiting, further evaluation with MRI can be performed if there are no clinical contraindications.      Electronically signed by: Concha Alas MD  Date:    05/23/2023  Time:    06:30               Narrative:    EXAMINATION:  CT HEAD WITHOUT CONTRAST    CLINICAL HISTORY:  Headache, sudden, severe;    TECHNIQUE:  Low dose axial images were obtained through the head.  Coronal and sagittal reformations were also performed. Contrast was not administered.    COMPARISON:  None.    FINDINGS:  There is no acute intracranial hemorrhage, hydrocephalus, midline shift or mass effect. Gray-white matter differentiation appears maintained. The basal cisterns are patent. The mastoid air cells and paranasal sinuses are clear of acute process. The visualized bones of the calvarium demonstrate no acute osseous abnormality.                                       CT Cervical Spine Without Contrast (Final result)  Result time 05/23/23 06:32:39      Final result by Concha Alas MD (05/23/23 06:32:39)                   Impression:      1. No CT evidence of acute cervical spine fracture or traumatic subluxation.  Clinical correlation and further assessment as warranted.  2. Postoperative change of C5-C6 intervertebral disc spacer.  3. Multiple normal sized cervical chain lymph nodes.      Electronically signed by: Concha Alas MD  Date:    05/23/2023  Time:    06:32               Narrative:    EXAMINATION:  CT CERVICAL SPINE WITHOUT CONTRAST    CLINICAL HISTORY:  Neck pain, chronic;States new neurologic findings to the upper and lower extremities;    TECHNIQUE:  Low dose axial images, sagittal and coronal reformations were performed though the cervical spine.  Contrast was not administered.    COMPARISON:  None    FINDINGS:  There is straightening of normal cervical lordosis.  Otherwise, cervical vertebral body alignment is within normal limits.  There is  postoperative change of intervertebral disc spacer placement at the C5-C6 level.  The facet joints articulate appropriately.  No significant prevertebral soft tissue swelling.  There is slight intervertebral disc height loss at the C4-C5 and C6-C7 levels with small anterior osteophytes and discogenic change.  The visualized skull base is intact.  Soft tissue structures demonstrate normal sized cervical chain lymph nodes.  Visualized lung apices are free of pleural fluid or focal consolidation.                                       CT Lumbar Spine Without Contrast (Final result)  Result time 05/23/23 06:34:34      Final result by Concha Alas MD (05/23/23 06:34:34)                   Impression:      1. No CT evidence of acute lumbar spine fracture or traumatic subluxation.  Clinical correlation and further assessment as warranted.  2. Bilateral L5 pars defects with minimal anterolisthesis of L5 on S1.  3. Multiple normal and prominent periaortic lymph nodes and mesenteric lymph nodes in the right lower quadrant of uncertain etiology/clinical significance.  Clinical correlation advised.      Electronically signed by: Concha Alas MD  Date:    05/23/2023  Time:    06:34               Narrative:    EXAMINATION:  CT LUMBAR SPINE WITHOUT CONTRAST    CLINICAL HISTORY:  Low back pain, increased fracture risk;    TECHNIQUE:  Low-dose axial, sagittal and coronal reformations are obtained through the lumbar spine.  Contrast was not administered.    COMPARISON:  None.    FINDINGS:  There is minimal anterolisthesis of L5 on S1 secondary to bilateral L5 pars defects.  There is straightening of the normal lumbar lordosis.  Otherwise, lumbar vertebral body alignment is within normal limits.  Vertebral body heights appear maintained.  Intervertebral disc heights appear maintained noting circumferential disc bulges at the L4-L5 and L5-S1 levels with at most mild spinal canal stenosis.  There is degenerative change of the  sacroiliac joints.    The visualized structures of the posterior abdomen demonstrate multiple normal and prominent periaortic lymph nodes of uncertain etiology/clinical significance.                                       Medications   sodium chloride 0.9% bolus 1,000 mL 1,000 mL (0 mLs Intravenous Stopped 5/23/23 1107)   prochlorperazine tablet 10 mg (10 mg Oral Given 5/23/23 0618)   diphenhydrAMINE injection 25 mg (25 mg Intravenous Given 5/23/23 0552)   LORazepam injection 1 mg (1 mg Intravenous Given 5/23/23 0553)   iohexoL (OMNIPAQUE 350) injection 80 mL (80 mLs Intravenous Given 5/23/23 0756)   HYDROmorphone injection 0.5 mg (0.5 mg Intravenous Given 5/23/23 1235)                 ED Course as of 05/24/23 0654   Tue May 23, 2023   0631 X-Ray Chest AP Portable  Impression:     No acute intrathoracic abnormality identified on this single radiographic view of the chest. [CC]   0633 EKG:  Rate 82, regular rhythm, sinus rhythm, intervals within normal limits, no ST elevations or depressions noted, similar to previous. [CC]   0641 Hand off to Dr. Reed, pending trop.  [CC]      ED Course User Index  [CC] Chay Alston MD                 Clinical Impression:   Final diagnoses:  [R03.0] Elevated blood pressure reading  [G44.209] Tension-type headache, not intractable, unspecified chronicity pattern (Primary)  [M54.41, G89.29, M54.42] Chronic low back pain with bilateral sciatica, unspecified back pain laterality  [R93.89] Abnormal CT scan - Liver, kidney and ovarian lesions        ED Disposition Condition    Discharge Stable          ED Prescriptions       Medication Sig Dispense Start Date End Date Auth. Provider    naproxen (NAPROSYN) 500 MG tablet Take 1 tablet (500 mg total) by mouth 2 (two) times daily with meals. As needed for pain for 20 days 60 tablet 5/23/2023 6/12/2023 Ann Reed MD    methocarbamoL (ROBAXIN) 500 MG Tab  (Status: Discontinued) Take 2 tablets (1,000 mg total) by mouth 3 (three) times  daily. As needed for muscle relaxation for 5 days 30 tablet 5/23/2023 5/23/2023 Ann Reed MD    LIDOcaine (LIDODERM) 5 % Place 1 patch onto the skin daily as needed. Remove & Discard patch within 12 hours, apply only to intact skin. 10 patch 5/23/2023 6/2/2023 Ann Reed MD    methocarbamoL (ROBAXIN) 500 MG Tab Take 2 tablets (1,000 mg total) by mouth 3 (three) times daily. As needed for muscle relaxation for 5 days 30 tablet 5/23/2023 5/28/2023 Ann Reed MD          Follow-up Information       Follow up With Specialties Details Why Contact Info    Robert Da Silva MD Internal Medicine Schedule an appointment as soon as possible for a visit in 2 days to discuss recent ED visit, to establish primary doctor [address] [place] [address] Dr Sandoval [place]WARD [id number] [phone]      [facility] [place] Dept Emergency Medicine  As needed, If symptoms worsen [address] [facility] [address] [phone]             Chay Alston MD  05/24/23 7987

## 2023-05-23 NOTE — ED TRIAGE NOTES
Pt presents to ER via EMS. Pt has c/o HA neck and back pain that she rates 9/10 at this time. Pt states her legs feel frozen and she can not walk. Pt states this all started yesterday at 1600. Pt has no other complaints at this time. Pt AAOx4.    #[id number]

## 2023-05-23 NOTE — ED PROVIDER NOTES
"[place] of Care Note:  Discussed pt and plan with prior physician. Patient was personally seen and examined by me.  Pt's vitals have been Temp: [97.9 °F (36.6 °C)] 97.9 °F (36.6 °C)  Heart Rate: [81] 81  Resp: [22] 22  BP: (117-156)/(77-94) 156/84  Pt pending repeat trop and metabolization of medications.  We will continue current treatment plan and reassess the patient for any changes.     Evaluated patient at shift change with , she is drowsy from the Compazine, Benadryl, Ativan.  Her blood pressure is 98/64.  She is moving all extremities equally.  She is able to answer questions but is falling asleep with the .  She denies any chest pain or abdominal pain.  Her abdomen is soft with no right lower quadrant abdominal pain or tenderness on my exam.  She reports she feels weak all over.  She is not able to fully participate in neuro exam at this time secondary to drowsiness from likely medication however she is moving extremities equally to commands, sensation is intact bilaterally, she denies paresthesias at this time.  Her pupils are equal round and reactive and she has no facial droop.  She reports she feels improved but now is sleepy.  Dispo pending CT abdomen and pelvis, repeat troponin and further evaluation.    Per review of her earlier EKG at 4:40 a.m. patient is normal sinus rhythm at 82.  No ST elevation or significant depression.  T-wave inversions in V1 and 3.  Normal axis.  QTC is 446.  This EKG is similar to her previous from August 2021 including the T-wave inversions which were previously present.    Patient initially not complaining of headache to me. She continued to appropriate metabolize the medication and wake up. She states her headache has now returned. She also reports back pain, chronic in nature x 5 years, but worsening after an argument with her neighbor yesterday where she felt a "twinge" her her back. Denies new trauma. Denies bowel or bladder complaint. Reports " paresthesias down posterior legs to medial foot bilaterally. She states she has been using a walker for 5 years 2/2 to back pain and follows at [facility] but unsure her doctors name.     PVR 48, normal rectal tone. No saddle numbness. Straight leg test negative bilaterally. Afebrile. Pain is bandlike to the lumbar area, not specifically midline or with isolated tenderness pretty there bony prominences. Doubt cauda equina, epidural abscess, or conus medullaris at this time. 2+ reflexes to bilateral knees and ankles. Babinski normal. However given reports increased weakness (4/5 on my exam to bilateral lower extremities, although patient was able to scoot down in bed without assistance for pelvic exam) and paresthesias discussed case and CT findings with Dr. Esposito to discuss emergent MRI. He believes that patient can safely follow up with PA in neurosurgery clinic in the next few days to schedule an outpatient MRI.     Pelvic exam with Pelvic exam: normal external genitalia, closed cervical os, normal vaginal walls without evidence of tears or trauma. No blood in vault. Physiologic discharge present. No CMT, no adnexal masses or tenderness. Female chaperone present for entire exam. Doubt PID. No infection noted on wet prep and GC/CT sent.     UA without signs of UTI, doubt pyelonephritis.     Discussed with patient mild anemia.     I discussed this with patient and she is feeling more comfortable after dilaudid. Denies headache, able to dress herself without assistance in room. She states she will call a ride. I also discussed with her other findings on her CT including abnormalities of the liver, kidney and ovary and the possibility this could be cancer and need for close follow up. All of these conversations were had with interpretor in Faroese. ([id number])    She was given strict return precautions for bowel or bladder complaint, worsening pain, fever, numbness, weakness or any other concerns and is understanding of need for  immediate ED evaluation should that occur.     All questions answered and patient satisfied with the plan and feeling improved. Will discharge with Robaxin, Naproxen and lidocaine for back pain.  without recent narcotic Rx.       Ann Reed MD  05/23/23 2514

## 2023-05-23 NOTE — DISCHARGE INSTRUCTIONS
Please see your primary care doctor in 2-3 days to discuss recent ED visit and to get follow up on the abnormalities on your CT scan including your ovary, kidney and liver as we discussed. There is a small chance that this could be cancer so needs to be closely evaluated by your primary care doctor. I have copied the report below to bring with you.     Please return to the ED immediately for any numbness, weakness, numbness in your groin, fever, stool or urinating on yourself or inability to stool or urinate. Follow up with your primary care doctor in 2-3 days to discuss recent ED visit and to discuss MRI. I spoke with neurosurgery today and they would like to see you in clinic in 2-3 days for recheck of symptoms.     Thank you for coming to our Emergency Department today. As we discussed, it is important to remember that some problems are difficult to diagnose and may not be found during your Emergency Department visit. Be sure to follow up with your primary care doctor and review all labs/imaging/tests that were performed during this visit with them. Some labs/tests may be outside of the normal range and require non-emergent follow-up and further investigation to help diagnose/exclude/prevent complications or other medical conditions.    If you do not have a primary care doctor, you may contact the one listed on your discharge paperwork or you may also call the [facility] Appointment Desk at [phone] to schedule an appointment and establish care with one. It is important to your health that you have a primary care doctor.    Please take all medications as directed. All medications may potentially have side-effects and it is impossible to predict which medications may give you side-effects or what side-effects (if any) they will give you.. If you feel that you are having a negative effect or side-effect of any medication you should immediately stop taking them and seek medical attention. If you feel that  you are having a life-threatening reaction call 911.    Return to the ER with any questions/concerns, new/concerning symptoms, worsening or failure to improve.     Do not drive, swim, climb to height, take a bath or make any important decisions for 24 hours if you have received any pain medications, sedatives or mood altering drugs during your ER visit.

## 2023-05-24 LAB
C TRACH DNA SPEC QL NAA+PROBE: NOT DETECTED
N GONORRHOEA DNA SPEC QL NAA+PROBE: NOT DETECTED

## 2023-08-24 ENCOUNTER — TELEPHONE (OUTPATIENT)
Dept: NEUROSURGERY | Facility: CLINIC | Age: 38
End: 2023-08-24

## 2023-08-24 NOTE — TELEPHONE ENCOUNTER
Attempted to reach patient via phone but the recipient of the call states she does not live at that residence or is available at that number.